# Patient Record
Sex: FEMALE | Race: WHITE | NOT HISPANIC OR LATINO
[De-identification: names, ages, dates, MRNs, and addresses within clinical notes are randomized per-mention and may not be internally consistent; named-entity substitution may affect disease eponyms.]

---

## 2017-05-19 ENCOUNTER — APPOINTMENT (OUTPATIENT)
Dept: RHEUMATOLOGY | Facility: CLINIC | Age: 77
End: 2017-05-19

## 2017-05-19 VITALS
HEIGHT: 62 IN | DIASTOLIC BLOOD PRESSURE: 73 MMHG | HEART RATE: 76 BPM | SYSTOLIC BLOOD PRESSURE: 149 MMHG | WEIGHT: 186 LBS | OXYGEN SATURATION: 95 % | BODY MASS INDEX: 34.23 KG/M2

## 2017-05-19 DIAGNOSIS — M25.571 PAIN IN RIGHT ANKLE AND JOINTS OF RIGHT FOOT: ICD-10-CM

## 2017-05-19 DIAGNOSIS — M25.572 PAIN IN RIGHT ANKLE AND JOINTS OF RIGHT FOOT: ICD-10-CM

## 2017-05-23 ENCOUNTER — FORM ENCOUNTER (OUTPATIENT)
Age: 77
End: 2017-05-23

## 2017-05-24 ENCOUNTER — APPOINTMENT (OUTPATIENT)
Dept: MRI IMAGING | Facility: CLINIC | Age: 77
End: 2017-05-24

## 2017-05-24 ENCOUNTER — OUTPATIENT (OUTPATIENT)
Dept: OUTPATIENT SERVICES | Facility: HOSPITAL | Age: 77
LOS: 1 days | End: 2017-05-24
Payer: MEDICARE

## 2017-05-24 ENCOUNTER — APPOINTMENT (OUTPATIENT)
Dept: RADIOLOGY | Facility: CLINIC | Age: 77
End: 2017-05-24

## 2017-05-24 DIAGNOSIS — M25.571 PAIN IN RIGHT ANKLE AND JOINTS OF RIGHT FOOT: ICD-10-CM

## 2017-05-24 DIAGNOSIS — M25.572 PAIN IN LEFT ANKLE AND JOINTS OF LEFT FOOT: ICD-10-CM

## 2017-05-24 PROCEDURE — 73721 MRI JNT OF LWR EXTRE W/O DYE: CPT

## 2017-05-24 PROCEDURE — 73610 X-RAY EXAM OF ANKLE: CPT

## 2017-06-02 ENCOUNTER — APPOINTMENT (OUTPATIENT)
Dept: RHEUMATOLOGY | Facility: CLINIC | Age: 77
End: 2017-06-02

## 2017-06-02 VITALS
HEIGHT: 62 IN | BODY MASS INDEX: 33.86 KG/M2 | OXYGEN SATURATION: 98 % | SYSTOLIC BLOOD PRESSURE: 155 MMHG | HEART RATE: 64 BPM | DIASTOLIC BLOOD PRESSURE: 71 MMHG | TEMPERATURE: 97.5 F | WEIGHT: 184 LBS

## 2017-06-02 DIAGNOSIS — M76.60 ACHILLES TENDINITIS, UNSPECIFIED LEG: ICD-10-CM

## 2018-12-17 ENCOUNTER — OUTPATIENT (OUTPATIENT)
Dept: EMERGENCY DEPT | Facility: HOSPITAL | Age: 78
LOS: 1 days | Discharge: HOME | End: 2018-12-17
Payer: MEDICARE

## 2018-12-17 VITALS
RESPIRATION RATE: 18 BRPM | TEMPERATURE: 98 F | SYSTOLIC BLOOD PRESSURE: 180 MMHG | HEART RATE: 72 BPM | OXYGEN SATURATION: 98 % | HEIGHT: 62 IN | WEIGHT: 179.9 LBS | DIASTOLIC BLOOD PRESSURE: 80 MMHG

## 2018-12-17 DIAGNOSIS — I25.110 ATHEROSCLEROTIC HEART DISEASE OF NATIVE CORONARY ARTERY WITH UNSTABLE ANGINA PECTORIS: ICD-10-CM

## 2018-12-17 DIAGNOSIS — Z98.890 OTHER SPECIFIED POSTPROCEDURAL STATES: Chronic | ICD-10-CM

## 2018-12-17 DIAGNOSIS — R94.39 ABNORMAL RESULT OF OTHER CARDIOVASCULAR FUNCTION STUDY: ICD-10-CM

## 2018-12-17 LAB
ALBUMIN SERPL ELPH-MCNC: 3.7 G/DL — SIGNIFICANT CHANGE UP (ref 3.3–5)
ALP SERPL-CCNC: 85 U/L — SIGNIFICANT CHANGE UP (ref 40–120)
ALT FLD-CCNC: 25 U/L — SIGNIFICANT CHANGE UP (ref 10–45)
ANION GAP SERPL CALC-SCNC: 15 MMOL/L — SIGNIFICANT CHANGE UP (ref 5–17)
AST SERPL-CCNC: 23 U/L — SIGNIFICANT CHANGE UP (ref 10–40)
BILIRUB SERPL-MCNC: 0.5 MG/DL — SIGNIFICANT CHANGE UP (ref 0.2–1.2)
BUN SERPL-MCNC: 11 MG/DL — SIGNIFICANT CHANGE UP (ref 7–23)
CALCIUM SERPL-MCNC: 8.3 MG/DL — LOW (ref 8.4–10.5)
CHLORIDE SERPL-SCNC: 103 MMOL/L — SIGNIFICANT CHANGE UP (ref 96–108)
CK MB CFR SERPL CALC: 1.6 NG/ML — SIGNIFICANT CHANGE UP (ref 0–3.8)
CO2 SERPL-SCNC: 22 MMOL/L — SIGNIFICANT CHANGE UP (ref 22–31)
CREAT SERPL-MCNC: 0.74 MG/DL — SIGNIFICANT CHANGE UP (ref 0.5–1.3)
GLUCOSE SERPL-MCNC: 94 MG/DL — SIGNIFICANT CHANGE UP (ref 70–99)
HCT VFR BLD CALC: 41.8 % — SIGNIFICANT CHANGE UP (ref 34.5–45)
HGB BLD-MCNC: 13.7 G/DL — SIGNIFICANT CHANGE UP (ref 11.5–15.5)
INR BLD: 1.28 RATIO — HIGH (ref 0.88–1.16)
MCHC RBC-ENTMCNC: 28.2 PG — SIGNIFICANT CHANGE UP (ref 27–34)
MCHC RBC-ENTMCNC: 32.8 GM/DL — SIGNIFICANT CHANGE UP (ref 32–36)
MCV RBC AUTO: 85.8 FL — SIGNIFICANT CHANGE UP (ref 80–100)
PLATELET # BLD AUTO: 242 K/UL — SIGNIFICANT CHANGE UP (ref 150–400)
POTASSIUM SERPL-MCNC: 3.3 MMOL/L — LOW (ref 3.5–5.3)
POTASSIUM SERPL-SCNC: 3.3 MMOL/L — LOW (ref 3.5–5.3)
PROT SERPL-MCNC: 7.2 G/DL — SIGNIFICANT CHANGE UP (ref 6–8.3)
PROTHROM AB SERPL-ACNC: 14.7 SEC — HIGH (ref 10–12.9)
RBC # BLD: 4.87 M/UL — SIGNIFICANT CHANGE UP (ref 3.8–5.2)
RBC # FLD: 12.6 % — SIGNIFICANT CHANGE UP (ref 10.3–14.5)
SODIUM SERPL-SCNC: 140 MMOL/L — SIGNIFICANT CHANGE UP (ref 135–145)
TROPONIN T, HIGH SENSITIVITY RESULT: 6 NG/L — SIGNIFICANT CHANGE UP (ref 0–51)
WBC # BLD: 10.3 K/UL — SIGNIFICANT CHANGE UP (ref 3.8–10.5)
WBC # FLD AUTO: 10.3 K/UL — SIGNIFICANT CHANGE UP (ref 3.8–10.5)

## 2018-12-17 RX ORDER — ASPIRIN/CALCIUM CARB/MAGNESIUM 324 MG
81 TABLET ORAL DAILY
Qty: 0 | Refills: 0 | Status: DISCONTINUED | OUTPATIENT
Start: 2018-12-17 | End: 2018-12-18

## 2018-12-17 RX ORDER — LANOLIN ALCOHOL/MO/W.PET/CERES
5 CREAM (GRAM) TOPICAL AT BEDTIME
Qty: 0 | Refills: 0 | Status: DISCONTINUED | OUTPATIENT
Start: 2018-12-17 | End: 2018-12-18

## 2018-12-17 RX ORDER — LOSARTAN POTASSIUM 100 MG/1
25 TABLET, FILM COATED ORAL DAILY
Qty: 0 | Refills: 0 | Status: DISCONTINUED | OUTPATIENT
Start: 2018-12-17 | End: 2018-12-18

## 2018-12-17 RX ORDER — SIMVASTATIN 20 MG/1
20 TABLET, FILM COATED ORAL AT BEDTIME
Qty: 0 | Refills: 0 | Status: DISCONTINUED | OUTPATIENT
Start: 2018-12-17 | End: 2018-12-18

## 2018-12-17 RX ORDER — CHLORTHALIDONE 50 MG
25 TABLET ORAL DAILY
Qty: 0 | Refills: 0 | Status: DISCONTINUED | OUTPATIENT
Start: 2018-12-17 | End: 2018-12-18

## 2018-12-17 RX ORDER — CLOPIDOGREL BISULFATE 75 MG/1
75 TABLET, FILM COATED ORAL DAILY
Qty: 0 | Refills: 0 | Status: DISCONTINUED | OUTPATIENT
Start: 2018-12-17 | End: 2018-12-18

## 2018-12-17 RX ORDER — POTASSIUM CHLORIDE 20 MEQ
40 PACKET (EA) ORAL ONCE
Qty: 0 | Refills: 0 | Status: COMPLETED | OUTPATIENT
Start: 2018-12-17 | End: 2018-12-17

## 2018-12-17 RX ADMIN — Medication 40 MILLIEQUIVALENT(S): at 22:12

## 2018-12-17 NOTE — H&P CARDIOLOGY - HISTORY OF PRESENT ILLNESS
77 y/o F with PMHx HTN, HLD and temporal arteritis presents to [facility] ED c/o chest tightness x 1 week, pt had outpt stress test today showing anterior septal ischemia, referred to ED for catheterization. Upon arrival to cath lab pt denies current CP but states she experienced CP during the stress test. She denies current SOB, abd pain, HA, numbness, weakness, n/v. Pt is non-compliant with daily baby ASA, no AC, denies prior stent placement or angiogram. 79 y/o F with PMHx HTN, HLD and temporal arteritis presents to [facility] ED c/o intermittent chest tightness radiating to the back of her neck associated with SOB x 2 months, pt had outpatient stress test today showing anterior septal ischemia, referred to ED for catheterization. Upon arrival to cath lab pt denies current CP but states she experienced CP during the stress test. She denies current SOB, abd pain, HA, numbness, weakness, n/v. Pt is non-compliant with daily baby ASA, no AC, denies prior stent placement or angiogram.    cards: Domenic Olivares

## 2018-12-17 NOTE — ED PROVIDER NOTE - ATTENDING CONTRIBUTION TO CARE
I have seen and evaluated this patient with the [place] practice clinician.   I agree with the findings  unless other wise stated. I have amended notes where needed.  After my face to face bedside evaluation, I am noting: Pt with chest pain had a stress test that was found abnormal so sent for a coronary cath, No pain at present no sob no diaphoresis Lungs clear  Heart regular no JVD detailed exam done will admit for coronary cath  to cath lab --Garcias

## 2018-12-17 NOTE — CHART NOTE - NSCHARTNOTEFT_GEN_A_CORE
Removal of Radial Band    Pulses in the right upper extremity are palpable. The patient was placed in the supine position. The insertion site was identified and the band deflated per protocol. The radial band was removed slowly. Direct pressure was applied for  8minutes by PABLO Lopez.  Monitoring of the right wrists and both upper extremities including neuro-vascular checks and vital signs every 15 minutes x 4.    Complications: None    Comments: patient may sit up and ambulate 30 minutes post band removal which will be 2330.     GERALDO Hawley [id number]

## 2018-12-17 NOTE — ED PROVIDER NOTE - MEDICAL DECISION MAKING DETAILS
Pt with chest pain had a stress test that was found abnormal so sent for a coronary cath, No pain at present no sob no diaphoresis Lungs clear  Heart regular no JVD detailed exam done will admit for coronary cath  to cath lab --Garcias

## 2018-12-17 NOTE — ED ADULT NURSE NOTE - OBJECTIVE STATEMENT
78 year old female patient presents ambulatory to ED from cardiologists office for + stress test. Patient reports chest pain while on treadmill, denies current CP, SOB or palpitations. Patient well appearing with no acute ditress noted. Patient transported to cath lab with DELMA Lackey.

## 2018-12-17 NOTE — CHART NOTE - NSCHARTNOTEFT_GEN_A_CORE
Patient underwent a PCI procedure and is being admitted as they are at increased risk for major adverse cardiac and vascular events if discharged due to the following high risk characteristics:    Major criteria- UA  Age > 75  Jessica James [phone]

## 2018-12-17 NOTE — ED PROVIDER NOTE - PHYSICAL EXAMINATION
GEN: Pt in NAD, A&O x3.   EYES: Sclera white w/o injection, PERRLA.   ENT: Head NCAT. Nose w/o deformity. No auricular TTP. MMM. Neck supple FROM.   RESP: No chest wall tenderness, CTA b/l, no wheezes, rales, or rhonchi.   CARDIAC: RRR, clear distinct S1, S2, murmurs, gallops, or rubs.   ABD: Abdomen obese, soft, non-tender, no rebound or guarding. No CVAT b/l.  VASC: 2+ carotid, radial, and dorsalis pedis pulses b/l. No edema or tenderness of the lower extremities.  SKIN: No rashes on the trunk. GEN: Pt in NAD, A&O x3.   EYES: Sclera white w/o injection, PERRLA.   ENT: Head NCAT. Nose w/o deformity. No auricular TTP. MMM. Neck supple FROM.   RESP: No chest wall tenderness, CTA b/l, no wheezes, rales, or rhonchi.   CARDIAC: RRR, clear distinct S1, S2, murmurs, gallops, or rubs.   ABD: Abdomen obese, soft, non-tender, no rebound or guarding. No CVAT b/l.  VASC: 2+ carotid, radial, and dorsalis pedis pulses b/l. No edema or tenderness of the lower extremities.  SKIN: No rashes on the trunk.                Dr renteria note --

## 2018-12-17 NOTE — ED PROVIDER NOTE - OBJECTIVE STATEMENT
79 y/o F with PMHx HTN presents c/o chest tightness x 1 week, pt had outpt stress test today showing anterior septal ischemia, referred to ED for catheterization. Pt denies current CP but states she had some during the stress test. Pt denies current SOB, abd pain, HA, numbness, paresthesas, weakness, f/c, n/v, f/c. Pt is non-compliant with daily baby ASA, no AC, denies prior stent placement or angiogram.

## 2018-12-17 NOTE — ED ADULT TRIAGE NOTE - CHIEF COMPLAINT QUOTE
chest pain/tightness for a week, had Nuclear stress test today and has anterior septal ischemia. denies shortness of breathe, had not taken bp meds for 4 days

## 2018-12-18 ENCOUNTER — TRANSCRIPTION ENCOUNTER (OUTPATIENT)
Age: 78
End: 2018-12-18

## 2018-12-18 VITALS
SYSTOLIC BLOOD PRESSURE: 131 MMHG | DIASTOLIC BLOOD PRESSURE: 58 MMHG | RESPIRATION RATE: 18 BRPM | HEART RATE: 83 BPM | OXYGEN SATURATION: 95 %

## 2018-12-18 DIAGNOSIS — I10 ESSENTIAL (PRIMARY) HYPERTENSION: ICD-10-CM

## 2018-12-18 DIAGNOSIS — R94.39 ABNORMAL RESULT OF OTHER CARDIOVASCULAR FUNCTION STUDY: ICD-10-CM

## 2018-12-18 DIAGNOSIS — I25.10 ATHEROSCLEROTIC HEART DISEASE OF NATIVE CORONARY ARTERY WITHOUT ANGINA PECTORIS: ICD-10-CM

## 2018-12-18 LAB
ANION GAP SERPL CALC-SCNC: 14 MMOL/L — SIGNIFICANT CHANGE UP (ref 5–17)
BASOPHILS # BLD AUTO: 0.1 K/UL — SIGNIFICANT CHANGE UP (ref 0–0.2)
BASOPHILS NFR BLD AUTO: 0.5 % — SIGNIFICANT CHANGE UP (ref 0–2)
BUN SERPL-MCNC: 11 MG/DL — SIGNIFICANT CHANGE UP (ref 7–23)
CALCIUM SERPL-MCNC: 8.3 MG/DL — LOW (ref 8.4–10.5)
CHLORIDE SERPL-SCNC: 103 MMOL/L — SIGNIFICANT CHANGE UP (ref 96–108)
CO2 SERPL-SCNC: 23 MMOL/L — SIGNIFICANT CHANGE UP (ref 22–31)
CREAT SERPL-MCNC: 0.75 MG/DL — SIGNIFICANT CHANGE UP (ref 0.5–1.3)
EOSINOPHIL # BLD AUTO: 0.4 K/UL — SIGNIFICANT CHANGE UP (ref 0–0.5)
EOSINOPHIL NFR BLD AUTO: 3.5 % — SIGNIFICANT CHANGE UP (ref 0–6)
GLUCOSE SERPL-MCNC: 110 MG/DL — HIGH (ref 70–99)
HCT VFR BLD CALC: 39.2 % — SIGNIFICANT CHANGE UP (ref 34.5–45)
HGB BLD-MCNC: 13.4 G/DL — SIGNIFICANT CHANGE UP (ref 11.5–15.5)
LYMPHOCYTES # BLD AUTO: 38.4 % — SIGNIFICANT CHANGE UP (ref 13–44)
LYMPHOCYTES # BLD AUTO: 4 K/UL — HIGH (ref 1–3.3)
MCHC RBC-ENTMCNC: 29.1 PG — SIGNIFICANT CHANGE UP (ref 27–34)
MCHC RBC-ENTMCNC: 34.1 GM/DL — SIGNIFICANT CHANGE UP (ref 32–36)
MCV RBC AUTO: 85.2 FL — SIGNIFICANT CHANGE UP (ref 80–100)
MONOCYTES # BLD AUTO: 0.7 K/UL — SIGNIFICANT CHANGE UP (ref 0–0.9)
MONOCYTES NFR BLD AUTO: 6.5 % — SIGNIFICANT CHANGE UP (ref 2–14)
NEUTROPHILS # BLD AUTO: 5.4 K/UL — SIGNIFICANT CHANGE UP (ref 1.8–7.4)
NEUTROPHILS NFR BLD AUTO: 51.2 % — SIGNIFICANT CHANGE UP (ref 43–77)
PLATELET # BLD AUTO: 246 K/UL — SIGNIFICANT CHANGE UP (ref 150–400)
POTASSIUM SERPL-MCNC: 3.9 MMOL/L — SIGNIFICANT CHANGE UP (ref 3.5–5.3)
POTASSIUM SERPL-SCNC: 3.9 MMOL/L — SIGNIFICANT CHANGE UP (ref 3.5–5.3)
RBC # BLD: 4.6 M/UL — SIGNIFICANT CHANGE UP (ref 3.8–5.2)
RBC # FLD: 12.6 % — SIGNIFICANT CHANGE UP (ref 10.3–14.5)
SODIUM SERPL-SCNC: 140 MMOL/L — SIGNIFICANT CHANGE UP (ref 135–145)
WBC # BLD: 10.5 K/UL — SIGNIFICANT CHANGE UP (ref 3.8–10.5)
WBC # FLD AUTO: 10.5 K/UL — SIGNIFICANT CHANGE UP (ref 3.8–10.5)

## 2018-12-18 PROCEDURE — 85610 PROTHROMBIN TIME: CPT

## 2018-12-18 PROCEDURE — 85027 COMPLETE CBC AUTOMATED: CPT

## 2018-12-18 PROCEDURE — 82553 CREATINE MB FRACTION: CPT

## 2018-12-18 PROCEDURE — 93005 ELECTROCARDIOGRAM TRACING: CPT

## 2018-12-18 PROCEDURE — 99285 EMERGENCY DEPT VISIT HI MDM: CPT

## 2018-12-18 PROCEDURE — 93454 CORONARY ARTERY ANGIO S&I: CPT | Mod: 59

## 2018-12-18 PROCEDURE — C1887: CPT

## 2018-12-18 PROCEDURE — C1769: CPT

## 2018-12-18 PROCEDURE — C1725: CPT

## 2018-12-18 PROCEDURE — 84484 ASSAY OF TROPONIN QUANT: CPT

## 2018-12-18 PROCEDURE — 99152 MOD SED SAME PHYS/QHP 5/>YRS: CPT

## 2018-12-18 PROCEDURE — 80048 BASIC METABOLIC PNL TOTAL CA: CPT

## 2018-12-18 PROCEDURE — 76937 US GUIDE VASCULAR ACCESS: CPT

## 2018-12-18 PROCEDURE — 80053 COMPREHEN METABOLIC PANEL: CPT

## 2018-12-18 PROCEDURE — 99153 MOD SED SAME PHYS/QHP EA: CPT

## 2018-12-18 PROCEDURE — C1874: CPT

## 2018-12-18 PROCEDURE — C9600: CPT | Mod: LD

## 2018-12-18 PROCEDURE — C1894: CPT

## 2018-12-18 RX ORDER — LANOLIN ALCOHOL/MO/W.PET/CERES
1 CREAM (GRAM) TOPICAL
Qty: 0 | Refills: 0 | COMMUNITY
Start: 2018-12-18

## 2018-12-18 RX ORDER — SIMVASTATIN 20 MG/1
1 TABLET, FILM COATED ORAL
Qty: 30 | Refills: 2 | OUTPATIENT
Start: 2018-12-18 | End: 2019-03-17

## 2018-12-18 RX ORDER — CLOPIDOGREL BISULFATE 75 MG/1
1 TABLET, FILM COATED ORAL
Qty: 90 | Refills: 3 | OUTPATIENT
Start: 2018-12-18 | End: 2019-12-12

## 2018-12-18 RX ADMIN — CLOPIDOGREL BISULFATE 75 MILLIGRAM(S): 75 TABLET, FILM COATED ORAL at 06:03

## 2018-12-18 RX ADMIN — Medication 81 MILLIGRAM(S): at 06:03

## 2018-12-18 RX ADMIN — LOSARTAN POTASSIUM 25 MILLIGRAM(S): 100 TABLET, FILM COATED ORAL at 06:03

## 2018-12-18 RX ADMIN — Medication 25 MILLIGRAM(S): at 06:03

## 2018-12-18 NOTE — DISCHARGE NOTE ADULT - PATIENT PORTAL LINK FT
You can access the [facility] Patient Portal, offered by [facility], by registering with the following website: [email or web address]

## 2018-12-18 NOTE — DISCHARGE NOTE ADULT - NS AS ACTIVITY OBS
Walking-Indoors allowed/No Heavy lifting/straining/Walking-Outdoors allowed/Stairs allowed/may shower 24 hours after procedure

## 2018-12-18 NOTE — DISCHARGE NOTE ADULT - PLAN OF CARE
keep stent open Low salt, low fat diet.   Weight management.   Take medications as prescribed.    No smoking.  Follow up appointments with your doctor(s)  as instructed. Your blood pressure will be controlled. Continue with your blood pressure medications; eat a heart healthy diet with low salt diet; exercise regularly (consult with your physician or cardiologist first); maintain a heart healthy weight; if you smoke - quit (A resource to help you stop smoking is the [facility] Tobacco [facility] – phone number [phone].); include healthy ways to manage stress. Continue to follow with your primary care physician or cardiologist. Your LDL cholesterol will be less than 70mg/dL Continue with your cholesterol medications. Eat a heart healthy diet that is low in saturated fats and salt, and includes whole grains, fruits, vegetables and lean protein; exercise regularly (consult with your physician or cardiologist first); maintain a heart healthy weight; if you smoke - quit (A resource to help you stop smoking is the [facility] Tobacco [facility] – phone number [phone].). Continue to follow with your primary physician or cardiologist.

## 2018-12-18 NOTE — DISCHARGE NOTE ADULT - HOSPITAL COURSE
77 y/o F with PMHx HTN, HLD and temporal arteritis presents to [facility] ED c/o intermittent chest tightness radiating to the back of her neck associated with SOB x 2 months, pt had outpatient stress test today showing anterior septal ischemia, referred to ED for catheterization. Upon arrival to cath lab pt denies current CP but states she experienced CP during the stress test. She denies current SOB, abd pain, HA, numbness, weakness, n/v. Pt is non-compliant with daily baby ASA, no AC, denies prior stent placement or angiogram.   S/p stent in LAD (99%) on 12/17 via right radial access and staged PCI in mRCA on 12/18 via right groin access, VSS. Site benign no bleeding or hematoma. Plan to discharge after 4 hours recovery today after safe ambulation. Discharge instruction given to pt fully understood, will f/u with Dr. Olivares in 2 weeks.

## 2018-12-18 NOTE — DISCHARGE NOTE ADULT - CARE PLAN
Principal Discharge DX:	CAD (coronary artery disease)  Goal:	keep stent open  Assessment and plan of treatment:	Low salt, low fat diet.   Weight management.   Take medications as prescribed.    No smoking.  Follow up appointments with your doctor(s)  as instructed.  Secondary Diagnosis:	Hypertension  Goal:	Your blood pressure will be controlled.  Assessment and plan of treatment:	Continue with your blood pressure medications; eat a heart healthy diet with low salt diet; exercise regularly (consult with your physician or cardiologist first); maintain a heart healthy weight; if you smoke - quit (A resource to help you stop smoking is the [facility] [employer] Control – phone number [phone].); include healthy ways to manage stress. Continue to follow with your primary care physician or cardiologist.  Secondary Diagnosis:	HLD (hyperlipidemia)  Goal:	Your LDL cholesterol will be less than 70mg/dL  Assessment and plan of treatment:	Continue with your cholesterol medications. Eat a heart healthy diet that is low in saturated fats and salt, and includes whole grains, fruits, vegetables and lean protein; exercise regularly (consult with your physician or cardiologist first); maintain a heart healthy weight; if you smoke - quit (A resource to help you stop smoking is the [facility] [employer] Control – phone number [phone].). Continue to follow with your primary physician or cardiologist.

## 2018-12-18 NOTE — DISCHARGE NOTE ADULT - ADDITIONAL INSTRUCTIONS
No heavy lifting or pushing/pulling with procedure arm for 2 weeks. No driving for 2 days. You may shower 24 hours following the procedure but avoid baths/swimming for 1 week. Check your Groin and wrist site for bleeding and/or swelling daily following procedure and call your doctor immediately if it occurs or if you experience increased pain at the site. Follow up with Dr. Olivares or Michelle in 1-2 weeks. You may call [facility] if you have any questions/concerns regarding your procedure [phone]. DO NOT STOP ASPIRIN OR PLAVIX UNLESS INSTRUCTED BY CARDIOLOGIST.

## 2018-12-18 NOTE — PROGRESS NOTE ADULT - SUBJECTIVE AND OBJECTIVE BOX
HPI: see paper chart for consult note  Pt feeling well after PCI this AM.  No c/o cp or sob      PAST MEDICAL & SURGICAL HISTORY:  Temporal arteritis  HLD (hyperlipidemia)  Hypertension  History of removal of cyst      Allergies    penicillin (Short breath)    Intolerances    Lipitor (Stomach Upset; Headache)        MEDICATIONS  (STANDING):  aspirin enteric coated 81 milliGRAM(s) Oral daily  chlorthalidone 25 milliGRAM(s) Oral daily  clopidogrel Tablet 75 milliGRAM(s) Oral daily  losartan 25 milliGRAM(s) Oral daily  melatonin 5 milliGRAM(s) Oral at bedtime  simvastatin 20 milliGRAM(s) Oral at bedtime    MEDICATIONS  (PRN):            Vital Signs Last 24 Hrs  T(C): 36.8 (18 Dec 2018 04:50), Max: 36.9 (17 Dec 2018 21:15)  T(F): 98.3 (18 Dec 2018 04:50), Max: 98.4 (17 Dec 2018 21:15)  HR: 65 (18 Dec 2018 06:00) (65 - 76)  BP: 133/55 (18 Dec 2018 06:00) (114/51 - 186/78)  BP(mean): 114 (17 Dec 2018 18:31) (114 - 114)  RR: 17 (18 Dec 2018 04:50) (16 - 18)  SpO2: 97% (18 Dec 2018 04:50) (95% - 100%)  Daily Height in cm: 157.48 (17 Dec 2018 20:30)    Daily Weight in k.4 (17 Dec 2018 18:31)  I&O's Detail    17 Dec 2018 07:01  -  18 Dec 2018 07:00  --------------------------------------------------------  IN:    Oral Fluid: 180 mL  Total IN: 180 mL    OUT:    Voided: 700 mL  Total OUT: 700 mL    Total NET: -520 mL          Physical Exam  Neck without JVD  Lungs clear  Cor s1s2 without m,r,g  Abd soft  Ext without edema, R groin catheter  R wrist Without ecchymosis, hematoma  LABS  PT/INR - ( 17 Dec 2018 21:08 )   PT: 14.7 sec;   INR: 1.28 ratio             CARDIAC MARKERS ( 17 Dec 2018 21:08 )  x     / x     / x     / x     / 1.6 ng/mL      CBC Full  -  ( 18 Dec 2018 05:02 )  WBC Count : 10.5 K/uL  Hemoglobin : 13.4 g/dL  Hematocrit : 39.2 %  Platelet Count - Automated : 246 K/uL  Mean Cell Volume : 85.2 fl  Mean Cell Hemoglobin : 29.1 pg  Mean Cell Hemoglobin Concentration : 34.1 gm/dL  Auto Neutrophil # : 5.4 K/uL  Auto Lymphocyte # : 4.0 K/uL  Auto Monocyte # : 0.7 K/uL  Auto Eosinophil # : 0.4 K/uL  Auto Basophil # : 0.1 K/uL  Auto Neutrophil % : 51.2 %  Auto Lymphocyte % : 38.4 %  Auto Monocyte % : 6.5 %  Auto Eosinophil % : 3.5 %  Auto Basophil % : 0.5 %        140  |  103  |  11  ----------------------------<  110<H>  3.9   |  23  |  0.75    Ca    8.3<L>      18 Dec 2018 05:02    TPro  7.2  /  Alb  3.7  /  TBili  0.5  /  DBili  x   /  AST  23  /  ALT  25  /  AlkPhos  85  12-17  EKG:  < from: 12 Lead ECG (18 @ 18:00) >    Ventricular Rate 71 BPM    Atrial Rate 71 BPM    P-R Interval 152 ms    QRS Duration 156 ms    Q-T Interval 436 ms    QTC Calculation(Bezet) 473 ms    P Axis 33 degrees    R Axis 51 degrees    T Axis 0 degrees    Diagnosis Line NORMAL SINUS RHYTHM WITH SINUS ARRHYTHMIA  RIGHT BUNDLE BRANCH BLOCK  ABNORMAL ECG    < end of copied text >      Assessment and Plan:  79 y/o F with PMHx HTN, HLD and temporal arteritis presents to [facility] ED c/o intermittent chest tightness radiating to the back of her neck associated with SOB x 2 months, pt had outpatient stress test today showing anterior septal ischemia, referred to ED for catheterization. The patient received PCI of mLAD with BRIAN x 1 (99%), [facility]  On ASA , Plavix, ARB statin

## 2018-12-18 NOTE — PROGRESS NOTE ADULT - SUBJECTIVE AND OBJECTIVE BOX
HPI:  79 y/o F with PMHx HTN, HLD and temporal arteritis presents to [facility] ED c/o intermittent chest tightness radiating to the back of her neck associated with SOB x 2 months, pt had outpatient stress test today showing anterior septal ischemia, referred to ED for catheterization. Upon arrival to cath lab pt denies current CP but states she experienced CP during the stress test. She denies current SOB, abd pain, HA, numbness, weakness, n/v. Pt is non-compliant with daily baby ASA, no AC, denies prior stent placement or angiogram.    12/18/18  [facility]  Mid LAD (99%) x1 BRIAN      Pt seen and examined by bedside.      Subjective/Observations:       REVIEW OF SYSTEMS: All other review of systems is negative unless indicated above    PAST MEDICAL & SURGICAL HISTORY:  Temporal arteritis  HLD (hyperlipidemia)  Hypertension  History of removal of cyst      MEDICATIONS  (STANDING):  aspirin enteric coated 81 milliGRAM(s) Oral daily  chlorthalidone 25 milliGRAM(s) Oral daily  clopidogrel Tablet 75 milliGRAM(s) Oral daily  losartan 25 milliGRAM(s) Oral daily  melatonin 5 milliGRAM(s) Oral at bedtime  simvastatin 20 milliGRAM(s) Oral at bedtime    MEDICATIONS  (PRN):      Allergies    penicillin (Short breath)    Intolerances    Lipitor (Stomach Upset; Headache)          Vital Signs Last 24 Hrs  T(C): 36.9 (17 Dec 2018 21:15), Max: 36.9 (17 Dec 2018 21:15)  T(F): 98.4 (17 Dec 2018 21:15), Max: 98.4 (17 Dec 2018 21:15)  HR: 72 (18 Dec 2018 00:00) (65 - 76)  BP: 114/51 (18 Dec 2018 00:00) (114/51 - 186/78)  BP(mean): 114 (17 Dec 2018 18:31) (114 - 114)  RR: 18 (18 Dec 2018 00:00) (16 - 18)  SpO2: 95% (18 Dec 2018 00:00) (95% - 100%)    I&O's Summary    Weight (kg): 79.4 (12-17 @ 20:30)    PHYSICAL EXAM:  TELE:   Constitutional: NAD, awake and alert, well-developed  HEENT: Moist Mucous Membranes, Anicteric  Pulmonary: Non-labored, breath sounds are clear bilaterally, No wheezing, rales or rhonchi  Cardiovascular: Regular, S1 and S2, No murmurs, rubs, gallops or clicks  Gastrointestinal: Bowel Sounds present, soft, nontender.   Lymph: No peripheral edema. No lymphadenopathy.  Skin: No visible rashes or ulcers.  Extremities: Right radial site intact, no bleeding, No hematoma      Plan: 12/18/18- Pt staged RCA today    LABS: All Labs Reviewed:                        13.7   10.3  )-----------( 242      ( 17 Dec 2018 21:08 )             41.8     17 Dec 2018 21:08    140    |  103    |  11     ----------------------------<  94     3.3     |  22     |  0.74     Ca    8.3        17 Dec 2018 21:08    TPro  7.2    /  Alb  3.7    /  TBili  0.5    /  DBili  x      /  AST  23     /  ALT  25     /  AlkPhos  85     17 Dec 2018 21:08    PT/INR - ( 17 Dec 2018 21:08 )   PT: 14.7 sec;   INR: 1.28 ratio           CARDIAC MARKERS ( 17 Dec 2018 21:08 )  x     / x     / x     / x     / 1.6 ng/mL HPI:  79 y/o F with PMHx HTN, HLD and temporal arteritis presents to [facility] ED c/o intermittent chest tightness radiating to the back of her neck associated with SOB x 2 months, pt had outpatient stress test today showing anterior septal ischemia, referred to ED for catheterization. Upon arrival to cath lab pt denies current CP but states she experienced CP during the stress test. She denies current SOB, abd pain, HA, numbness, weakness, n/v. Pt is non-compliant with daily baby ASA, no AC, denies prior stent placement or angiogram.      Pt seen and examined by bedside.      Subjective/Observations:       REVIEW OF SYSTEMS: All other review of systems is negative unless indicated above    PAST MEDICAL & SURGICAL HISTORY:  Temporal arteritis  HLD (hyperlipidemia)  Hypertension  History of removal of cyst      MEDICATIONS  (STANDING):  aspirin enteric coated 81 milliGRAM(s) Oral daily  chlorthalidone 25 milliGRAM(s) Oral daily  clopidogrel Tablet 75 milliGRAM(s) Oral daily  losartan 25 milliGRAM(s) Oral daily  melatonin 5 milliGRAM(s) Oral at bedtime  simvastatin 20 milliGRAM(s) Oral at bedtime    MEDICATIONS  (PRN):      Allergies    penicillin (Short breath)    Intolerances    Lipitor (Stomach Upset; Headache)          Vital Signs Last 24 Hrs  T(C): 36.9 (17 Dec 2018 21:15), Max: 36.9 (17 Dec 2018 21:15)  T(F): 98.4 (17 Dec 2018 21:15), Max: 98.4 (17 Dec 2018 21:15)  HR: 72 (18 Dec 2018 00:00) (65 - 76)  BP: 114/51 (18 Dec 2018 00:00) (114/51 - 186/78)  BP(mean): 114 (17 Dec 2018 18:31) (114 - 114)  RR: 18 (18 Dec 2018 00:00) (16 - 18)  SpO2: 95% (18 Dec 2018 00:00) (95% - 100%)    I&O's Summary    Weight (kg): 79.4 (12-17 @ 20:30)    PHYSICAL EXAM:  TELE:   Constitutional: NAD, awake and alert, well-developed  HEENT: Moist Mucous Membranes, Anicteric  Pulmonary: Non-labored, breath sounds are clear bilaterally, No wheezing, rales or rhonchi  Cardiovascular: Regular, S1 and S2, No murmurs, rubs, gallops or clicks  Gastrointestinal: Bowel Sounds present, soft, nontender.   Lymph: No peripheral edema. No lymphadenopathy.  Skin: No visible rashes or ulcers.  Extremities: Right radial site intact, no bleeding, No hematoma      Plan: 12/18/18- Pt staged RCA today    LABS: All Labs Reviewed:                        13.7   10.3  )-----------( 242      ( 17 Dec 2018 21:08 )             41.8     17 Dec 2018 21:08    140    |  103    |  11     ----------------------------<  94     3.3     |  22     |  0.74     Ca    8.3        17 Dec 2018 21:08    TPro  7.2    /  Alb  3.7    /  TBili  0.5    /  DBili  x      /  AST  23     /  ALT  25     /  AlkPhos  85     17 Dec 2018 21:08    PT/INR - ( 17 Dec 2018 21:08 )   PT: 14.7 sec;   INR: 1.28 ratio           CARDIAC MARKERS ( 17 Dec 2018 21:08 )  x     / x     / x     / x     / 1.6 ng/mL HPI:  79 y/o F with PMHx HTN, HLD and temporal arteritis presents to [facility] ED c/o intermittent chest tightness radiating to the back of her neck associated with SOB x 2 months, pt had outpatient stress test today showing anterior septal ischemia, referred to ED for catheterization. The patient received PCI of mLAD with BRIAN x 1 (    Pt seen and examined by bedside.      Subjective/Observations:       REVIEW OF SYSTEMS: All other review of systems is negative unless indicated above    PAST MEDICAL & SURGICAL HISTORY:  Temporal arteritis  HLD (hyperlipidemia)  Hypertension  History of removal of cyst      MEDICATIONS  (STANDING):  aspirin enteric coated 81 milliGRAM(s) Oral daily  chlorthalidone 25 milliGRAM(s) Oral daily  clopidogrel Tablet 75 milliGRAM(s) Oral daily  losartan 25 milliGRAM(s) Oral daily  melatonin 5 milliGRAM(s) Oral at bedtime  simvastatin 20 milliGRAM(s) Oral at bedtime    MEDICATIONS  (PRN):      Allergies    penicillin (Short breath)    Intolerances    Lipitor (Stomach Upset; Headache)          Vital Signs Last 24 Hrs  T(C): 36.9 (17 Dec 2018 21:15), Max: 36.9 (17 Dec 2018 21:15)  T(F): 98.4 (17 Dec 2018 21:15), Max: 98.4 (17 Dec 2018 21:15)  HR: 72 (18 Dec 2018 00:00) (65 - 76)  BP: 114/51 (18 Dec 2018 00:00) (114/51 - 186/78)  BP(mean): 114 (17 Dec 2018 18:31) (114 - 114)  RR: 18 (18 Dec 2018 00:00) (16 - 18)  SpO2: 95% (18 Dec 2018 00:00) (95% - 100%)    I&O's Summary    Weight (kg): 79.4 (12-17 @ 20:30)    PHYSICAL EXAM:  TELE:   Constitutional: NAD, awake and alert, well-developed  HEENT: Moist Mucous Membranes, Anicteric  Pulmonary: Non-labored, breath sounds are clear bilaterally, No wheezing, rales or rhonchi  Cardiovascular: Regular, S1 and S2, No murmurs, rubs, gallops or clicks  Gastrointestinal: Bowel Sounds present, soft, nontender.   Lymph: No peripheral edema. No lymphadenopathy.  Skin: No visible rashes or ulcers.  Extremities: Right radial site intact, no bleeding, No hematoma      Plan: 12/18/18- Pt staged RCA today    LABS: All Labs Reviewed:                        13.7   10.3  )-----------( 242      ( 17 Dec 2018 21:08 )             41.8     17 Dec 2018 21:08    140    |  103    |  11     ----------------------------<  94     3.3     |  22     |  0.74     Ca    8.3        17 Dec 2018 21:08    TPro  7.2    /  Alb  3.7    /  TBili  0.5    /  DBili  x      /  AST  23     /  ALT  25     /  AlkPhos  85     17 Dec 2018 21:08    PT/INR - ( 17 Dec 2018 21:08 )   PT: 14.7 sec;   INR: 1.28 ratio           CARDIAC MARKERS ( 17 Dec 2018 21:08 )  x     / x     / x     / x     / 1.6 ng/mL HPI:  77 y/o F with PMHx HTN, HLD and temporal arteritis presents to [facility] ED c/o intermittent chest tightness radiating to the back of her neck associated with SOB x 2 months, pt had outpatient stress test today showing anterior septal ischemia, referred to ED for catheterization. The patient received PCI [facility] mLAD with [facility] x 1 (99[facility]     Pt seen and examined by bedside.      Subjective/Observations:       REVIEW OF SYSTEMS: All other review of systems is negative unless indicated above    PAST MEDICAL & SURGICAL HISTORY:  Temporal arteritis  HLD (hyperlipidemia)  Hypertension  History of removal of cyst      MEDICATIONS  (STANDING):  aspirin enteric coated 81 milliGRAM(s) Oral daily  chlorthalidone 25 milliGRAM(s) Oral daily  clopidogrel Tablet 75 milliGRAM(s) Oral daily  losartan 25 milliGRAM(s) Oral daily  melatonin 5 milliGRAM(s) Oral at bedtime  simvastatin 20 milliGRAM(s) Oral at bedtime    MEDICATIONS  (PRN):      Allergies    penicillin (Short breath)    Intolerances    Lipitor (Stomach Upset; Headache)          Vital Signs Last 24 Hrs  T(C): 36.9 (17 Dec 2018 21:15), Max: 36.9 (17 Dec 2018 21:15)  T(F): 98.4 (17 Dec 2018 21:15), Max: 98.4 (17 Dec 2018 21:15)  HR: 72 (18 Dec 2018 00:00) (65 - 76)  BP: 114/51 (18 Dec 2018 00:00) (114/51 - 186/78)  BP(mean): 114 (17 Dec 2018 18:31) (114 - 114)  RR: 18 (18 Dec 2018 00:00) (16 - 18)  SpO2: 95% (18 Dec 2018 00:00) (95% - 100%)    I&O's Summary    Weight (kg): 79.4 (12-17 @ 20:30)    PHYSICAL EXAM:  TELE:   Constitutional: NAD, awake and alert, well-developed  HEENT: Moist Mucous Membranes, Anicteric  Pulmonary: Non-labored, breath sounds are clear bilaterally, No wheezing, rales or rhonchi  Cardiovascular: Regular, S1 and S2, No murmurs, rubs, gallops or clicks  Gastrointestinal: Bowel Sounds present, soft, nontender.   Lymph: No peripheral edema. No lymphadenopathy.  Skin: No visible rashes or ulcers.  Extremities: Right radial site intact, no bleeding, No hematoma      Plan: 12/18/18- Pt staged RCA today    LABS: All Labs Reviewed:                        13.7   10.3  )-----------( 242      ( 17 Dec 2018 21:08 )             41.8     17 Dec 2018 21:08    140    |  103    |  11     ----------------------------<  94     3.3     |  22     |  0.74     Ca    8.3        17 Dec 2018 21:08    TPro  7.2    /  Alb  3.7    /  TBili  0.5    /  DBili  x      /  AST  23     /  ALT  25     /  AlkPhos  85     17 Dec 2018 21:08    PT/INR - ( 17 Dec 2018 21:08 )   PT: 14.7 sec;   INR: 1.28 ratio           CARDIAC MARKERS ( 17 Dec 2018 21:08 )  x     / x     / x     / x     / 1.6 ng/mL

## 2018-12-18 NOTE — DISCHARGE NOTE ADULT - MEDICATION SUMMARY - MEDICATIONS TO TAKE
I will START or STAY ON the medications listed below when I get home from the hospital:    aspirin 81 mg oral tablet  -- 1 tab(s) by mouth once a day  -- Indication: For keep stent open    irbesartan 150 mg oral tablet  -- 1 tab(s) by mouth once a day  -- Indication: For blood pressure    Zocor 20 mg oral tablet  -- 1 tab(s) by mouth once a day (at bedtime)  -- Indication: For Cholesterol    Plavix 75 mg oral tablet  -- 1 tab(s) by mouth once a day  -- Indication: For keep stent open    chlorthalidone 25 mg oral tablet  -- 1 tab(s) by mouth once a day  -- Indication: For blood pressure    melatonin 5 mg oral tablet  -- 1 tab(s) by mouth once a day (at bedtime)  -- Indication: For sleep

## 2018-12-18 NOTE — PROGRESS NOTE ADULT - ASSESSMENT
77 y/o F with PMHx HTN, HLD and temporal arteritis presents to [facility] ED c/o intermittent chest tightness radiating to the back of her neck associated with SOB x 2 months, pt had outpatient stress test today showing anterior septal ischemia, referred to ED for catheterization.    12/18/18  [facility]  Mid LAD (99%) x1 BRIAN 79 y/o F with PMHx HTN, HLD and temporal arteritis presents to [facility] ED c/o intermittent chest tightness radiating to the back of her neck associated with SOB x 2 months, pt had outpatient stress test today showing anterior septal ischemia, referred to ED for catheterization.    12/17/18  [facility]  Mid LAD (99%) x1 BRIAN  mRCA-90% stenosis 79 y/o F with PMHx HTN, HLD and temporal arteritis presents to [facility] ED c/o intermittent chest tightness radiating to the back of her neck associated with SOB x 2 months, pt had outpatient stress test today showing anterior septal ischemia, referred to ED for catheterization.    12/17/18  [facility]  Mid LAD (99%) x1 BRIAN  mRCA-90% stenosis, staged PCI in 12/18.

## 2018-12-18 NOTE — CHART NOTE - NSCHARTNOTEFT_GEN_A_CORE
Removal of Femoral Sheath    Pulses in the right lower extremity are palpable by doppler/absent.   The patient was placed in the supine position. The insertion site was identified and the sutures were removed per protocol.    The __6__ [place] femoral sheath was then removed.   Direct pressure was applied for  20 minutes.   Complications: None, VSS, Good Hemostasis.     Monitoring of the right/left groin and both lower extremities including neuro-vascular checks and vital signs every 15 minutes x 4, then every 30 minutes x 2, then every 1 hour was ordered.    Discharge Instruction discussed with patient: ASA, Plavix/Brilinta, statin, diet, activities, access site care, follow up care, reportable signs and symptoms.     A/P    78 year old male/female Hx of HTN, HLD, s/p mLAD yesterday, Stent in RCA today.     Plan: continue to monitor, Continue ASA, Plavix, Statin,   discharge home in am if stable.  Pt will follow up with her cardiologist Dr. Olivares in 1-2weeks.

## 2018-12-18 NOTE — PROGRESS NOTE ADULT - PROBLEM SELECTOR PLAN 1
LHC performed  c/w present treatment regimen CLARITZAC performed  c/w simvastatin PCI of mLAD (99% stenosis)  mRCA to be staged on 12/18  Continue Aspirin 81 mg, Plavix 75 mg  -patient intolerance to lipitor, starting low dose simvastatin PCI of mLAD (99% stenosis)  mRCA to be staged on 12/18  Continue Aspirin 81 mg, Plavix 75 mg, lopressor 12.5 mg BID  -patient intolerance to lipitor, starting low dose simvastatin

## 2018-12-18 NOTE — DISCHARGE NOTE ADULT - CARE PROVIDER_API CALL
Domenic Olivares), Cardiology; Internal Medicine  [address] [place] [address]  [address]  [place] [address]  Phone: [phone]  Fax: [phone]

## 2018-12-28 DIAGNOSIS — Z88.0 ALLERGY STATUS TO PENICILLIN: ICD-10-CM

## 2018-12-28 DIAGNOSIS — M31.6 OTHER GIANT CELL ARTERITIS: ICD-10-CM

## 2018-12-28 DIAGNOSIS — Z87.891 PERSONAL HISTORY OF NICOTINE DEPENDENCE: ICD-10-CM

## 2018-12-28 DIAGNOSIS — Z79.82 LONG TERM (CURRENT) USE OF ASPIRIN: ICD-10-CM

## 2018-12-28 DIAGNOSIS — I10 ESSENTIAL (PRIMARY) HYPERTENSION: ICD-10-CM

## 2018-12-28 DIAGNOSIS — I25.110 ATHEROSCLEROTIC HEART DISEASE OF NATIVE CORONARY ARTERY WITH UNSTABLE ANGINA PECTORIS: ICD-10-CM

## 2018-12-28 DIAGNOSIS — E78.5 HYPERLIPIDEMIA, UNSPECIFIED: ICD-10-CM

## 2018-12-28 RX ORDER — CHLORTHALIDONE 50 MG
1 TABLET ORAL
Qty: 0 | Refills: 0 | COMMUNITY

## 2018-12-28 RX ORDER — ASPIRIN/CALCIUM CARB/MAGNESIUM 324 MG
1 TABLET ORAL
Qty: 0 | Refills: 0 | COMMUNITY

## 2018-12-28 RX ORDER — IRBESARTAN 75 MG/1
1 TABLET ORAL
Qty: 0 | Refills: 0 | COMMUNITY

## 2019-05-30 PROBLEM — E78.5 HYPERLIPIDEMIA, UNSPECIFIED: Chronic | Status: ACTIVE | Noted: 2018-12-17

## 2019-05-30 PROBLEM — M31.6 OTHER GIANT CELL ARTERITIS: Chronic | Status: ACTIVE | Noted: 2018-12-17

## 2019-05-30 PROBLEM — I10 ESSENTIAL (PRIMARY) HYPERTENSION: Chronic | Status: ACTIVE | Noted: 2018-12-17

## 2019-06-03 ENCOUNTER — APPOINTMENT (OUTPATIENT)
Dept: RHEUMATOLOGY | Facility: CLINIC | Age: 79
End: 2019-06-03
Payer: MEDICARE

## 2019-06-03 VITALS
TEMPERATURE: 98.3 F | HEART RATE: 67 BPM | WEIGHT: 184 LBS | OXYGEN SATURATION: 97 % | HEIGHT: 62 IN | SYSTOLIC BLOOD PRESSURE: 119 MMHG | DIASTOLIC BLOOD PRESSURE: 70 MMHG | BODY MASS INDEX: 33.86 KG/M2

## 2019-06-03 DIAGNOSIS — R51 HEADACHE: ICD-10-CM

## 2019-06-03 DIAGNOSIS — M31.6 OTHER GIANT CELL ARTERITIS: ICD-10-CM

## 2019-06-03 DIAGNOSIS — M19.049 PRIMARY OSTEOARTHRITIS, UNSPECIFIED HAND: ICD-10-CM

## 2019-06-03 PROCEDURE — 99214 OFFICE O/P EST MOD 30 MIN: CPT

## 2019-06-03 NOTE — HISTORY OF PRESENT ILLNESS
[FreeTextEntry1] : 1.  Possible temporal arteritis:  previously seen around 2012 for headaches, which were felt to perhaps represent temporal arteritis.  She received prednisone with relief.  She was in her usual state of health until the first week of August 2014 at which time she developed a diffuse headache.  She immediately saw her doctors  (Michelle and Vanna).  A CT was unremarkable, and  prednisone 40 mg/d was prescribed.   These symptoms were accompanied by swollen and painful hands as well as myalgias in the thighs.  With steroids, all symptoms resolved rather rapidly.  She has had no visual symptoms nor jaw claudication.  At most she will get a head pain lasting 1-2 seconds.  She became emotional on the steroids as well as fatigued despite reducing the dose of prednisone. The prednisone was eventually stopped, and she had not had any recurrence of significant headache.   The patient denied temporal headache, visual changes, jaw claudication, joint pain. She continued with chronic low-grade diffuse head pain.  No visual changes or jaw claudication. There was a two-year hiatus between visits in 2017 and 2019.  In May 2019 she developed pain behind the right ear, which improved with antibiotics. She, however, still had diffuse head pain but no acute visual changes, fever. CRP in May 2019 was 2.39; Hb normal. \par 2.  Swollen fingers:  mild discomfort. \par 3.  Achilles partial tear:  In the spring 2017 she developed pain in the posterior aspect of her left lower extremity walking down stairs.  She was given Voltaren and then prednisone.  Her diffuse pains improved on prednisone.   A Doppler study was normal.  An MRI demonstrated a partial Achilles' tear and retrocalcaneal bursitis.  \par 4. 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  Palpitations\par  \par Meds\par chlorthalidone\par Plavix 75 mg/d\par Losartan 100 mg/d\par Crestor 5 mg/d\par ASA 81 mg/d\par Aleve prn

## 2019-06-03 NOTE — PHYSICAL EXAM
[General Appearance - Alert] : alert [General Appearance - In No Acute Distress] : in no acute distress [General Appearance - Well Nourished] : well nourished [General Appearance - Well Developed] : well developed [General Appearance - Well-Appearing] : healthy appearing [Sclera] : the sclera and conjunctiva were normal [Extraocular Movements] : extraocular movements were intact [Strabismus] : no strabismus was seen [Outer Ear] : the ears and nose were normal in appearance [Nasal Cavity] : the nasal mucosa and septum were normal [Oropharynx] : the oropharynx was normal [Respiration, Rhythm And Depth] : normal respiratory rhythm and effort [Auscultation Breath Sounds / Voice Sounds] : lungs were clear to auscultation bilaterally [Heart Rate And Rhythm] : heart rate was normal and rhythm regular [Heart Sounds] : normal S1 and S2 [Heart Sounds Gallop] : no gallops [Murmurs] : no murmurs [Heart Sounds Pericardial Friction Rub] : no pericardial rub [Arterial Pulses Carotid] : carotid pulses were normal with no bruits [Bowel Sounds] : normal bowel sounds [Abdomen Soft] : soft [Abdomen Tenderness] : non-tender [No Spinal Tenderness] : no spinal tenderness [Abnormal Walk] : normal gait [Nail Clubbing] : no clubbing  or cyanosis of the fingernails [Motor Tone] : muscle strength and tone were normal [] : no rash [No Focal Deficits] : no focal deficits [Oriented To Time, Place, And Person] : oriented to person, place, and time [Cervical Lymph Nodes Enlarged Posterior Bilaterally] : posterior cervical [Cervical Lymph Nodes Enlarged Anterior Bilaterally] : anterior cervical [Supraclavicular Lymph Nodes Enlarged Bilaterally] : supraclavicular [No CVA Tenderness] : no ~M costovertebral angle tenderness [FreeTextEntry1] : tender behind both ears but no lymph node enlargement;  puffy fingers but no synovitis-some OA changes

## 2019-06-03 NOTE — ASSESSMENT
[FreeTextEntry1] : 1.  Possible temporal arteritis:  previously seen around 2012 for headaches, which were felt to perhaps represent temporal arteritis.  She received prednisone with relief.  She was in her usual state of health until the first week of August 2014 at which time she developed a diffuse headache.  She immediately saw her doctors  (Michelle and Vanna).  A CT was unremarkable, and  prednisone 40 mg/d was prescribed.   These symptoms were accompanied by swollen and painful hands as well as myalgias in the thighs.  With steroids, all symptoms resolved rather rapidly.  She has had no visual symptoms nor jaw claudication.  At most she will get a head pain lasting 1-2 seconds.  She became emotional on the steroids as well as fatigued despite reducing the dose of prednisone. The prednisone was eventually stopped, and she had not had any recurrence of significant headache.   The patient denied temporal headache, visual changes, jaw claudication, joint pain. She continued with chronic low-grade diffuse head pain.  No visual changes or jaw claudication. There was a two-year hiatus between visits in 2017 and 2019.  In May 2019 she developed pain behind the right ear, which improved with antibiotics. She, however, still had diffuse head pain but no acute visual changes, fever. CRP in May 2019 was 2.39; Hb normal. \par 2.  Swollen fingers:  mild discomfort. \par 3.  Achilles partial tear:  In the spring 2017 she developed pain in the posterior aspect of her left lower extremity walking down stairs.  She was given Voltaren and then prednisone.  Her diffuse pains improved on prednisone.   A Doppler study was normal.  An MRI demonstrated a partial Achilles' tear and retrocalcaneal bursitis.  \par 4. 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  Palpitations\par \par Plan:\par 1.  Review lab tests\par 2.  TA ultrasound\par 3.  No steroids at this time

## 2019-06-03 NOTE — DISCUSSION/SUMMARY
[FreeTextEntry1] : 1.  Possible temporal arteritis:  previously seen around 2012 for headaches, which were felt to perhaps represent temporal arteritis.  She received prednisone with relief.  She was in her usual state of health until the first week of August 2014 at which time she developed a diffuse headache.  She immediately saw her doctors  (Michelle and Vanna).  A CT was unremarkable, and  prednisone 40 mg/d was prescribed.   These symptoms were accompanied by swollen and painful hands as well as myalgias in the thighs.  With steroids, all symptoms resolved rather rapidly.  She has had no visual symptoms nor jaw claudication.  At most she will get a head pain lasting 1-2 seconds.  She became emotional on the steroids as well as fatigued despite reducing the dose of prednisone. The prednisone was eventually stopped, and she had not had any recurrence of significant headache.   The patient denies temporal headache, visual changes, jaw claudication, joint pain. She has continued with chronic low-grade diffuse head pain.  No visual changes or jaw claudication.\par 2.  Swollen fingers:  mild discomfort. \par 3.  Achilles partial tear:  In the spring 2017 she developed pain in the posterior aspect of her left lower extremity walking down stairs.  She was given Voltaren and then prednisone.  Her diffuse pains improved on prednisone.   A Doppler study was normal.  An MRI demonstrated a partial Achilles' tear and retrocalcaneal bursitis.  She has been using a bandage to help support the leg.  \par \par Plan:\par 1.  Lab tests\par 2.  Stop Aleve\par 3.  Return 3 months

## 2019-06-06 ENCOUNTER — APPOINTMENT (OUTPATIENT)
Dept: RHEUMATOLOGY | Facility: CLINIC | Age: 79
End: 2019-06-06
Payer: MEDICARE

## 2019-06-06 VITALS
WEIGHT: 182 LBS | DIASTOLIC BLOOD PRESSURE: 67 MMHG | HEART RATE: 70 BPM | OXYGEN SATURATION: 98 % | TEMPERATURE: 97.5 F | SYSTOLIC BLOOD PRESSURE: 114 MMHG | BODY MASS INDEX: 33.49 KG/M2 | HEIGHT: 62 IN

## 2019-06-06 PROCEDURE — 76882 US LMTD JT/FCL EVL NVASC XTR: CPT

## 2019-08-07 ENCOUNTER — MESSAGE (OUTPATIENT)
Age: 79
End: 2019-08-07

## 2019-08-13 ENCOUNTER — MESSAGE (OUTPATIENT)
Age: 79
End: 2019-08-13

## 2019-10-29 ENCOUNTER — HOSPITAL ENCOUNTER (OUTPATIENT)
Dept: HOSPITAL 93 - SONOGRAMA | Age: 79
Discharge: HOME | End: 2019-10-29
Attending: OBSTETRICS & GYNECOLOGY
Payer: COMMERCIAL

## 2019-10-29 DIAGNOSIS — N95.0: Primary | ICD-10-CM

## 2019-11-06 NOTE — ED ADULT NURSE NOTE - CHIEF COMPLAINT QUOTE
Health Maintenance Due   Topic Date Due   • Shingles Vaccine (1 of 2) 03/19/2011   • Pneumococcal Vaccine 0-64 (1 of 1 - PPSV23) 08/23/2018   • Depression Screening  06/12/2019   • Influenza Vaccine (1) 09/01/2019       Patient is due for topics as listed above but is not proceeding with Immunization(s) Influenza at this time.            chest pain/tightness for a week, had Nuclear stress test today and has anterior septal ischemia. denies shortness of breathe, had not taken bp meds for 4 days

## 2022-04-21 ENCOUNTER — APPOINTMENT (OUTPATIENT)
Dept: SURGERY | Facility: CLINIC | Age: 82
End: 2022-04-21
Payer: MEDICARE

## 2022-04-21 VITALS
HEIGHT: 62 IN | DIASTOLIC BLOOD PRESSURE: 66 MMHG | SYSTOLIC BLOOD PRESSURE: 130 MMHG | TEMPERATURE: 96.1 F | BODY MASS INDEX: 33.13 KG/M2 | OXYGEN SATURATION: 98 % | WEIGHT: 180 LBS | HEART RATE: 65 BPM | RESPIRATION RATE: 16 BRPM

## 2022-04-21 DIAGNOSIS — J35.1 HYPERTROPHY OF TONSILS: ICD-10-CM

## 2022-04-21 DIAGNOSIS — L72.3 SEBACEOUS CYST: ICD-10-CM

## 2022-04-21 DIAGNOSIS — L08.9 SEBACEOUS CYST: ICD-10-CM

## 2022-04-21 DIAGNOSIS — Z95.5 PRESENCE OF CORONARY ANGIOPLASTY IMPLANT AND GRAFT: ICD-10-CM

## 2022-04-21 PROCEDURE — 10060 I&D ABSCESS SIMPLE/SINGLE: CPT

## 2022-04-21 PROCEDURE — 99203 OFFICE O/P NEW LOW 30 MIN: CPT | Mod: 25

## 2022-04-21 RX ORDER — ROSUVASTATIN CALCIUM 5 MG/1
TABLET, FILM COATED ORAL
Refills: 0 | Status: ACTIVE | COMMUNITY

## 2022-04-21 RX ORDER — METOPROLOL TARTRATE 75 MG/1
TABLET, FILM COATED ORAL
Refills: 0 | Status: ACTIVE | COMMUNITY

## 2022-04-21 RX ORDER — METHYLPREDNISOLONE 4 MG/1
4 TABLET ORAL
Qty: 1 | Refills: 3 | Status: DISCONTINUED | COMMUNITY
Start: 2017-05-26 | End: 2022-04-21

## 2022-04-21 RX ORDER — LISINOPRIL 30 MG/1
TABLET ORAL
Refills: 0 | Status: ACTIVE | COMMUNITY

## 2022-04-21 RX ORDER — ASPIRIN 325 MG/1
TABLET, FILM COATED ORAL
Refills: 0 | Status: ACTIVE | COMMUNITY

## 2022-04-21 RX ORDER — HYDROCHLOROTHIAZIDE 12.5 MG/1
TABLET ORAL
Refills: 0 | Status: ACTIVE | COMMUNITY

## 2022-04-21 NOTE — HISTORY OF PRESENT ILLNESS
[de-identified] : This 82-year-old patient complains of a cyst on the mid chest which was much larger and more painful last week.  It drained some purulent fluid a few days ago.  She continues to complain of pain and swelling

## 2022-04-21 NOTE — CONSULT LETTER
[Dear  ___] : Dear  [unfilled], [Consult Letter:] : I had the pleasure of evaluating your patient, [unfilled]. [Please see my note below.] : Please see my note below. [Consult Closing:] : Thank you very much for allowing me to participate in the care of this patient.  If you have any questions, please do not hesitate to contact me. [Sincerely,] : Sincerely, [FreeTextEntry3] : Tanvir Menard MD, FACS\par [facility]\par [facility]\par [address]\par [place]  [address]\par Tel: [phone]\par Email: [email or web address] \par

## 2022-04-21 NOTE — ASSESSMENT
[FreeTextEntry1] : We will see this patient again on Monday to ensure proper healing of the wound Grossly Intact

## 2022-04-21 NOTE — PHYSICAL EXAM
[Normal Breath Sounds] : Normal breath sounds [Normal Heart Sounds] : normal heart sounds [de-identified] : No distress [de-identified] : Supple [de-identified] : There is a 3 x 2 cm area of induration and fluctuance over the mid sternum.  This was incised and drained under sterile conditions with local anesthesia.  A few cc of sebum were recovered.  The wound was irrigated with normal saline solution and a dry sterile dressing applied

## 2022-04-25 ENCOUNTER — APPOINTMENT (OUTPATIENT)
Dept: SURGERY | Facility: CLINIC | Age: 82
End: 2022-04-25
Payer: MEDICARE

## 2022-04-25 VITALS
RESPIRATION RATE: 17 BRPM | BODY MASS INDEX: 33.13 KG/M2 | DIASTOLIC BLOOD PRESSURE: 66 MMHG | SYSTOLIC BLOOD PRESSURE: 117 MMHG | HEART RATE: 91 BPM | WEIGHT: 180 LBS | HEIGHT: 62 IN | OXYGEN SATURATION: 97 %

## 2022-04-25 DIAGNOSIS — Z09 ENCOUNTER FOR FOLLOW-UP EXAMINATION AFTER COMPLETED TREATMENT FOR CONDITIONS OTHER THAN MALIGNANT NEOPLASM: ICD-10-CM

## 2022-04-25 DIAGNOSIS — Z51.89 ENCOUNTER FOR OTHER SPECIFIED AFTERCARE: ICD-10-CM

## 2022-04-25 DIAGNOSIS — Z98.890 OTHER SPECIFIED POSTPROCEDURAL STATES: ICD-10-CM

## 2022-04-25 PROCEDURE — 99211 OFF/OP EST MAY X REQ PHY/QHP: CPT

## 2022-04-25 NOTE — PHYSICAL EXAM
[Normal Breath Sounds] : Normal breath sounds [Normal Heart Sounds] : normal heart sounds [Calm] : calm [JVD] : no jugular venous distention  [de-identified] : NAD [de-identified] : Neuro- Cranial nerve grossly intact. Normal gait.  [de-identified] : ROM WNL [de-identified] : I & D site healing appropriately.

## 2022-04-25 NOTE — HISTORY OF PRESENT ILLNESS
[de-identified] : Meaghan is a 81 y/o female I&D of a 3 x 2 cm area of induration and fluctuance over the mid sternum.  Today she reports feeling well. I & D site with no drainage. minimal pain. No fever or chills.

## 2022-04-25 NOTE — PLAN
[FreeTextEntry1] : I & D site is healing appropriately. No drainage. Minimal discomfort. \par Advised to RTO see Dr. Benavides as needed.

## 2022-10-17 NOTE — PATIENT PROFILE ADULT - NSPROEDAABILITYLEARN_GEN_A_NUR
none [FreeTextEntry1] : Examination:\par Constitutional: normal, no apparent distress\par Eyes: normal conjunctiva b/l, no ptosis, visual fields full\par Respiratory: no respiratory distress, normal effort, normal auscultation\par Cardiovascular: normal rate, rhythm, no murmurs\par Neck: supple, no masses\par Vascular: carotids normal\par Skin: normal color, no rashes\par Psych: normal mood, affect\par \par Neurological:\par Memory: normal memory, oriented to person, place, time\par Language intact/no aphasia\par Cranial Nerves: II-XII intact, Pupils equally round and reactive to light, ocular muscles/movements intact, no ptosis, no facial weakness, tongue protrudes normally in the midline, \par Motor: normal tone, no pronator drift, full strength in all four extremities in the proximal and distal muscle groups\par Coordination: Fine motor movements intact, rapid alternating movements intact, finger to nose intact bilaterally\par Sensory: decreased sensation in finger tips\par DTRs: 1/4 in both biceps, radialis, patellars\par Gait: slightly wide based\par .

## 2024-02-13 NOTE — H&P CARDIOLOGY - PATIENT REFERRED TO
Patient sent back to waiting room after triage completed due to room unavailability at this time. Patient currently alert and in stable condition. Patient verbalized understanding of circumstances and instructed to seek staff assistance if any condition changes arise.    
Right sided flank pain started last night at rest (several episodes of dizziness and one episode of vomiting, denies current dizziness or vomiting, denies OTC medications, denies urinary symptoms)  
Cardiac catherization with possible intervention

## 2024-10-17 ENCOUNTER — HOSPITAL ENCOUNTER (OUTPATIENT)
Dept: HOSPITAL 93 - LAB | Age: 84
Discharge: HOME | End: 2024-10-17
Attending: INTERNAL MEDICINE
Payer: COMMERCIAL

## 2024-10-17 DIAGNOSIS — E78.1: ICD-10-CM

## 2024-10-17 DIAGNOSIS — R10.10: ICD-10-CM

## 2024-10-17 DIAGNOSIS — I11.9: Primary | ICD-10-CM

## 2024-10-17 DIAGNOSIS — M54.50: ICD-10-CM

## 2024-10-17 DIAGNOSIS — T82.897S: ICD-10-CM

## 2024-10-17 DIAGNOSIS — E11.69: ICD-10-CM

## 2024-10-17 LAB
ANION GAP SERPL CALC-SCNC: 7 MMOL/L (ref 10–20)
BUN SERPL-MCNC: 20 MG/DL (ref 7–18)
BUN/CREAT SERPL: 24 (ref 7–25)
CALCIUM SERPL-MCNC: 9.2 MG/DL (ref 8.5–10.1)
CHLORIDE SERPL-SCNC: 107 MMOL/L (ref 98–107)
CO2 SERPL-SCNC: 32 MEQ/L (ref 21–32)
CREAT SERPL-MCNC: 0.82 MG/DL (ref 0.55–1.02)
EGFRCR SERPLBLD CKD-EPI 2021: 66.1 ML/MIN/{1.73_M2}
GLUCOSE SERPL-MCNC: 104 MG/DL (ref 65–100)
OSMOLALITY SERPL: 286 MOSM/KG (ref 275–295)
POTASSIUM SERPL-SCNC: 3.9 MEQ/L (ref 3.5–5.1)
SODIUM SERPL-SCNC: 142 MMOL/L (ref 136–145)

## 2024-10-18 ENCOUNTER — HOSPITAL ENCOUNTER (OUTPATIENT)
Dept: HOSPITAL 93 - TOM | Age: 84
Discharge: HOME | End: 2024-10-18
Attending: INTERNAL MEDICINE
Payer: COMMERCIAL

## 2024-10-18 DIAGNOSIS — T82.897S: ICD-10-CM

## 2024-10-18 DIAGNOSIS — E78.1: ICD-10-CM

## 2024-10-18 DIAGNOSIS — M54.50: ICD-10-CM

## 2024-10-18 DIAGNOSIS — R10.10: ICD-10-CM

## 2024-10-18 DIAGNOSIS — I11.9: Primary | ICD-10-CM

## 2024-10-18 DIAGNOSIS — E11.69: ICD-10-CM

## 2024-10-18 PROCEDURE — 74177 CT ABD & PELVIS W/CONTRAST: CPT
